# Patient Record
Sex: FEMALE | Race: WHITE | URBAN - METROPOLITAN AREA
[De-identification: names, ages, dates, MRNs, and addresses within clinical notes are randomized per-mention and may not be internally consistent; named-entity substitution may affect disease eponyms.]

---

## 2017-02-24 ENCOUNTER — OFFICE VISIT (OUTPATIENT)
Dept: INTERNAL MEDICINE CLINIC | Age: 47
End: 2017-02-24

## 2017-02-24 VITALS
HEIGHT: 65 IN | WEIGHT: 214 LBS | TEMPERATURE: 99.3 F | HEART RATE: 90 BPM | SYSTOLIC BLOOD PRESSURE: 120 MMHG | DIASTOLIC BLOOD PRESSURE: 90 MMHG | BODY MASS INDEX: 35.65 KG/M2 | OXYGEN SATURATION: 96 % | RESPIRATION RATE: 18 BRPM

## 2017-02-24 DIAGNOSIS — J32.9 OTHER SINUSITIS, UNSPECIFIED CHRONICITY: Primary | ICD-10-CM

## 2017-02-24 RX ORDER — FLUCONAZOLE 150 MG/1
150 TABLET ORAL DAILY
Qty: 1 TAB | Refills: 0 | Status: SHIPPED | OUTPATIENT
Start: 2017-02-24 | End: 2017-02-25

## 2017-02-24 RX ORDER — AMOXICILLIN AND CLAVULANATE POTASSIUM 500; 125 MG/1; MG/1
1 TABLET, FILM COATED ORAL 2 TIMES DAILY
Qty: 14 TAB | Refills: 0 | Status: SHIPPED | OUTPATIENT
Start: 2017-02-24 | End: 2017-03-03

## 2017-02-24 NOTE — PROGRESS NOTES
Subjective:      Elina Cash is a 51 y.o. female who presents today with sinus pressure and headache for last week. She initially thought that the pressure was due to her allergies, but then chills and aches developed and she has progressively feeling worse in the last few days. Has had a fever. She did not get a flu vaccine this year    Patient Active Problem List   Diagnosis Code    Environmental allergies Z91.09    Depression F32.9    Right lateral epicondylitis M77.11    Pap smear for cervical cancer screening Z12.4    Hx of screening mammography Z92.89     Current Outpatient Prescriptions   Medication Sig Dispense Refill    amoxicillin-clavulanate (AUGMENTIN) 500-125 mg per tablet Take 1 Tab by mouth two (2) times a day for 7 days. 14 Tab 0    sertraline (ZOLOFT) 100 mg tablet TAKE 1 TABLET DAILY 90 Tab 1    KELNOR 1/35, 28, 1-35 mg-mcg per tablet   3    fluticasone (FLONASE) 50 mcg/actuation nasal spray 1-2 SPRAYS EACH NOSTRIL ONCE DAILY 16 g 6    phentermine (ADIPEX-P) 37.5 mg tablet Take 18.75 mg by mouth every morning.  calcium-cholecalciferol, d3, (CALCIUM 600 + D) 600-125 mg-unit Tab Take  by mouth.  evening primrose oil (EVENING PRIMROSE) 500 mg Cap Take  by mouth.  FLAXSEED OIL PO Take  by mouth.  magnesium 500 mg Tab Take  by mouth.  melatonin 1 mg tablet Take  by mouth. Review of Systems    Pertinent items are noted in HPI. Objective:     Visit Vitals    /90 (BP 1 Location: Left arm, BP Patient Position: Sitting)    Pulse 90    Temp 99.3 °F (37.4 °C) (Oral)    Resp 18    Ht 5' 5\" (1.651 m)    Wt 214 lb (97.1 kg)    SpO2 96%    BMI 35.61 kg/m2     General appearance: alert, cooperative, no distress, appears stated age  Head: Normocephalic, without obvious abnormality, atraumatic  Ears: abnormal TM AD - dull, serous middle ear fluid, abnormal TM AS - dull, serous middle ear fluid  Nose: Nares normal. Septum midline. Mucosa normal. No drainage or sinus tenderness. Throat: Lips, mucosa, and tongue normal. Teeth and gums normal and abnormal findings: mild oropharyngeal erythema and tonsillar hypertrophy 1+  Neck: supple, symmetrical, trachea midline, no adenopathy, mild anterior cervical adenopathy, no carotid bruit and no JVD  Lungs: clear to auscultation bilaterally    Rapid flu test - negative    Assessment/Plan:       ICD-10-CM ICD-9-CM    1. Other sinusitis, unspecified chronicity J32.9 473.8       Plan:  -saline rinse frequently with otc sinus saline nasal spray  -increase fluids and rest  -augmentin 500mg bid for 7 days.  -also given a diflucan as she tends to get yeast infection with antibiotic use. Orders Placed This Encounter    amoxicillin-clavulanate (AUGMENTIN) 500-125 mg per tablet     Sig: Take 1 Tab by mouth two (2) times a day for 7 days. Dispense:  14 Tab     Refill:  0    fluconazole (DIFLUCAN) 150 mg tablet     Sig: Take 1 Tab by mouth daily for 1 day. FDA advises cautious prescribing of oral fluconazole in pregnancy. Dispense:  1 Tab     Refill:  0        Follow-up Disposition:     Return if symptoms worsen or fail to improve. Advised patient to call back or return to office if symptoms worsen/change/persist.     Discussed expected course/resolution/complications of diagnosis in detail with patient. Medication risks/benefits/costs/interactions/alternatives discussed with patient. Patient was given an after visit summary which includes diagnoses, current medications, & vitals. Patient expressed understanding with the diagnosis and plan.

## 2017-02-24 NOTE — MR AVS SNAPSHOT
Visit Information Date & Time Provider Department Dept. Phone Encounter #  
 2/24/2017 12:00 PM Stas Owens MD [place] [id number] [facility] [phone] [id number] Upcoming Health Maintenance Date Due INFLUENZA AGE 9 TO ADULT 8/1/2016 PAP AKA CERVICAL CYTOLOGY 10/10/2017 DTaP/Tdap/Td series (2 - Td) 9/10/2025 Allergies as of 2/24/2017  Review Complete On: 2/24/2017 By: Stas Owens MD  
  
 Severity Noted Reaction Type Reactions Erythromycin  02/16/2012    Hives Current Immunizations  Reviewed on 2/16/2012 Name Date MMR Vaccine 2/16/2003 Tdap 9/10/2015 Not reviewed this visit Vitals BP  
  
  
  
  
  
 120/90 (BP 1 Location: Left arm, BP Patient Position: Sitting) BMI and BSA Data Body Mass Index Body Surface Area  
 35.61 kg/m 2 2.11 m 2 Preferred Pharmacy Pharmacy Name Phone [facility] # [address], [address] [facility] [phone] Your Updated Medication List  
  
   
This list is accurate as of: 2/24/17 12:47 PM.  Always use your most recent med list.  
  
  
  
  
 amoxicillin-clavulanate 500-125 mg per tablet Commonly known as:  AUGMENTIN Take 1 Tab by mouth two (2) times a day for 7 days. CALCIUM 600 + D 600-125 mg-unit Tab Generic drug:  calcium-cholecalciferol (d3) Take  by mouth. EVENING PRIMROSE 500 mg Cap Generic drug:  evening primrose oil Take  by mouth. FLAXSEED OIL PO Take  by mouth. fluconazole 150 mg tablet Commonly known as:  DIFLUCAN Take 1 Tab by mouth daily for 1 day. FDA advises cautious prescribing of oral fluconazole in pregnancy. fluticasone 50 mcg/actuation nasal spray Commonly known as:  FLONASE  
1-2 SPRAYS EACH NOSTRIL ONCE DAILY Janie Rolon 1/35 (28) 1-35 mg-mcg Tab Generic drug:  ethynodiol-ethinyl estradiol  
  
 magnesium 500 mg Tab Take  by mouth.  
  
 melatonin 1 mg tablet Take  by mouth. phentermine 37.5 mg tablet Commonly known as:  ADIPEX-P Take 18.75 mg by mouth every morning. sertraline 100 mg tablet Commonly known as:  ZOLOFT  
TAKE 1 TABLET DAILY Prescriptions Sent to Pharmacy Refills  
 amoxicillin-clavulanate (AUGMENTIN) 500-125 mg per tablet 0 Sig: Take 1 Tab by mouth two (2) times a day for 7 days. Class: Normal  
 Pharmacy: [facility] [address] [place] [address] Ph #: [phone] Route: Oral  
 fluconazole (DIFLUCAN) 150 mg tablet 0 Sig: Take 1 Tab by mouth daily for 1 day. FDA advises cautious prescribing of oral fluconazole in pregnancy. Class: Normal  
 Pharmacy: [facility] [address] [place] [address] Ph #: [phone] Route: Oral  
  
Introducing hospitals & [facility] SERVICES! Dear Ruthie Mccormick: Thank you for requesting a [employer] account. Our records indicate that you already have an active [employer] account. You can access your account anytime at [email or web address] Did you know that you can access your hospital and ER discharge instructions at any time in [employer]? You can also review all of your test results from your hospital stay or ER visit. Additional Information If you have questions, please visit the Frequently Asked Questions section of the [employer] website at [email or web address] Remember, [employer] is NOT to be used for urgent needs. For medical emergencies, dial 911. Now available from your iPhone and Android! Please provide this summary of care documentation to your next provider. Your primary care clinician is listed as Joseph Bradshaw. If you have any questions after today's visit, please call [phone].